# Patient Record
Sex: FEMALE | Race: WHITE | NOT HISPANIC OR LATINO
[De-identification: names, ages, dates, MRNs, and addresses within clinical notes are randomized per-mention and may not be internally consistent; named-entity substitution may affect disease eponyms.]

---

## 2020-01-03 ENCOUNTER — APPOINTMENT (OUTPATIENT)
Dept: OTOLARYNGOLOGY | Facility: CLINIC | Age: 65
End: 2020-01-03
Payer: COMMERCIAL

## 2020-01-03 VITALS
SYSTOLIC BLOOD PRESSURE: 119 MMHG | HEART RATE: 49 BPM | OXYGEN SATURATION: 97 % | BODY MASS INDEX: 22.76 KG/M2 | TEMPERATURE: 98.1 F | WEIGHT: 145 LBS | HEIGHT: 67 IN | RESPIRATION RATE: 15 BRPM | DIASTOLIC BLOOD PRESSURE: 77 MMHG

## 2020-01-03 DIAGNOSIS — H61.20 IMPACTED CERUMEN, UNSPECIFIED EAR: ICD-10-CM

## 2020-01-03 DIAGNOSIS — Z78.9 OTHER SPECIFIED HEALTH STATUS: ICD-10-CM

## 2020-01-03 DIAGNOSIS — Z87.891 PERSONAL HISTORY OF NICOTINE DEPENDENCE: ICD-10-CM

## 2020-01-03 DIAGNOSIS — Z82.2 FAMILY HISTORY OF DEAFNESS AND HEARING LOSS: ICD-10-CM

## 2020-01-03 DIAGNOSIS — Z72.89 OTHER PROBLEMS RELATED TO LIFESTYLE: ICD-10-CM

## 2020-01-03 DIAGNOSIS — Z82.0 FAMILY HISTORY OF EPILEPSY AND OTHER DISEASES OF THE NERVOUS SYSTEM: ICD-10-CM

## 2020-01-03 DIAGNOSIS — H61.21 IMPACTED CERUMEN, RIGHT EAR: ICD-10-CM

## 2020-01-03 PROBLEM — Z00.00 ENCOUNTER FOR PREVENTIVE HEALTH EXAMINATION: Status: ACTIVE | Noted: 2020-01-03

## 2020-01-03 PROCEDURE — 99242 OFF/OP CONSLTJ NEW/EST SF 20: CPT

## 2020-01-04 PROBLEM — H61.21 CERUMINOSIS, RIGHT: Status: ACTIVE | Noted: 2020-01-04

## 2020-01-04 NOTE — HISTORY OF PRESENT ILLNESS
[de-identified] : 63 yo woman with h/o recurrent cerumen impaction I had seen over 8 yrs ago at last visit - here with recurrent cerumen impaction in r ear, for removal. Referred by Dr Yolanda An. She reports that it recurs annually. Denies qtip use. denies pain or drainage but hearing is moderately decreased.

## 2023-01-24 ENCOUNTER — NON-APPOINTMENT (OUTPATIENT)
Age: 68
End: 2023-01-24

## 2023-03-06 ENCOUNTER — APPOINTMENT (OUTPATIENT)
Dept: OBGYN | Facility: CLINIC | Age: 68
End: 2023-03-06
Payer: MEDICARE

## 2023-03-06 VITALS
SYSTOLIC BLOOD PRESSURE: 125 MMHG | HEIGHT: 67 IN | HEART RATE: 75 BPM | DIASTOLIC BLOOD PRESSURE: 86 MMHG | WEIGHT: 148 LBS | BODY MASS INDEX: 23.23 KG/M2

## 2023-03-06 DIAGNOSIS — Z87.42 PERSONAL HISTORY OF OTHER DISEASES OF THE FEMALE GENITAL TRACT: ICD-10-CM

## 2023-03-06 DIAGNOSIS — Z01.419 ENCOUNTER FOR GYNECOLOGICAL EXAMINATION (GENERAL) (ROUTINE) W/OUT ABNORMAL FINDINGS: ICD-10-CM

## 2023-03-06 DIAGNOSIS — Z78.9 OTHER SPECIFIED HEALTH STATUS: ICD-10-CM

## 2023-03-06 DIAGNOSIS — Z82.69 FAMILY HISTORY OF OTHER DISEASES OF THE MUSCULOSKELETAL SYSTEM AND CONNECTIVE TISSUE: ICD-10-CM

## 2023-03-06 PROCEDURE — G0101: CPT

## 2023-03-06 NOTE — HISTORY OF PRESENT ILLNESS
[N] : Patient is not sexually active [Patient reported mammogram was normal] : Patient reported mammogram was normal [Hot Flashes] : hot flashes [Insomnia] : insomnia [Yes] : yes [Previously active] : previously active [Men] : men [FreeTextEntry1] : Patient present for initial office visit without complian [Patient reported bone density results were abnormal] : Patient reported bone density results were abnormal [Mammogramdate] : 1/2-23 [TextBox_37] : has known osteoporosis [TextBox_43] : has done 2 in past, will ask pmd [LMPDate] : 01/01/06 [TextBox_38] : 1 termination [TextBox_6] : mild clammy [TextBox_18] : stopped HT when she had breast bx (but it was benign)

## 2023-03-06 NOTE — REVIEW OF SYSTEMS
[Negative] : Heme/Lymph [FreeTextEntry8] : some dryness sense of needing to void [FreeTextEntry9] : some joint pain: trying to keep mobile [de-identified] : low bone mass

## 2023-03-06 NOTE — PHYSICAL EXAM
[Chaperone Present] : A chaperone was present in the examining room during all aspects of the physical examination [Appropriately responsive] : appropriately responsive [Alert] : alert [No Acute Distress] : no acute distress [No Lymphadenopathy] : no lymphadenopathy [Regular Rate Rhythm] : regular rate rhythm [Clear to Auscultation B/L] : clear to auscultation bilaterally [Soft] : soft [Oriented x3] : oriented x3 [Examination Of The Breasts] : a normal appearance [No Masses] : no breast masses were palpable [Labia Majora] : normal [Labia Minora] : normal [Atrophy] : atrophy [Normal] : normal [Normal Position] : in a normal position [Uterine Adnexae] : normal

## 2023-03-06 NOTE — PLAN
Patient walk-in to ED with c/o chest pain and pain radiating down Rt. arm that is non-reproducible. Pain started 1/2hour PTA to ED. [FreeTextEntry1] : annual: pap and hpv (had abnl 20 yrs ago?)\par \par hx of HT but came off after needing breast bx, but bx was benign\par doing yearly check at [facility] and just did mammo at [facility] 2 months ago, normal\par \par \par Bone density: now on alendronate\par may consider vagifem: r/b/a discussed\par to see PMD soon: will ask if due for colonoscopy\par not s/a now

## 2023-03-08 LAB — HPV HIGH+LOW RISK DNA PNL CVX: NOT DETECTED

## 2023-03-10 LAB — CYTOLOGY CVX/VAG DOC THIN PREP: ABNORMAL

## 2024-04-08 ENCOUNTER — RX RENEWAL (OUTPATIENT)
Age: 69
End: 2024-04-08

## 2024-04-08 RX ORDER — ESTRADIOL 10 UG/1
10 TABLET VAGINAL
Qty: 25 | Refills: 3 | Status: ACTIVE | COMMUNITY
Start: 2023-03-06 | End: 1900-01-01

## 2024-08-06 ENCOUNTER — TRANSCRIPTION ENCOUNTER (OUTPATIENT)
Age: 69
End: 2024-08-06

## 2024-08-06 ENCOUNTER — APPOINTMENT (OUTPATIENT)
Dept: OBGYN | Facility: CLINIC | Age: 69
End: 2024-08-06

## 2024-08-06 PROBLEM — N95.2 VAGINAL ATROPHY: Status: ACTIVE | Noted: 2024-08-06

## 2024-08-06 PROBLEM — Z71.89 COUNSELING FOR HORMONE REPLACEMENT THERAPY: Status: ACTIVE | Noted: 2024-08-06

## 2024-08-06 PROBLEM — Z87.42 HISTORY OF ABNORMAL CERVICAL PAP SMEAR: Status: RESOLVED | Noted: 2023-03-06 | Resolved: 2024-08-06

## 2024-08-06 PROBLEM — N89.8 VAGINAL DISCHARGE: Status: ACTIVE | Noted: 2024-08-06

## 2024-08-06 PROBLEM — K64.9 HEMORRHOID: Status: ACTIVE | Noted: 2024-08-06

## 2024-08-06 PROCEDURE — 99214 OFFICE O/P EST MOD 30 MIN: CPT

## 2024-08-06 PROCEDURE — 99459 PELVIC EXAMINATION: CPT

## 2024-08-06 NOTE — PHYSICAL EXAM
[Chaperone Present] : A chaperone was present in the examining room during all aspects of the physical examination [20235] : A chaperone was present during the pelvic exam. [Appropriately responsive] : appropriately responsive [Alert] : alert [No Acute Distress] : no acute distress [No Lymphadenopathy] : no lymphadenopathy [Regular Rate Rhythm] : regular rate rhythm [No Murmurs] : no murmurs [Clear to Auscultation B/L] : clear to auscultation bilaterally [Soft] : soft [Non-tender] : non-tender [Non-distended] : non-distended [No HSM] : No HSM [No Lesions] : no lesions [No Mass] : no mass [Oriented x3] : oriented x3 [Examination Of The Breasts] : a normal appearance [No Masses] : no breast masses were palpable [Labia Majora] : normal [Labia Minora] : normal [Normal] : normal [Uterine Adnexae] : normal [FreeTextEntry2] : nydia [Atrophy] : atrophy [No Bleeding] : There was no active vaginal bleeding [FreeTextEntry9] : s/p hemorrhoid repair

## 2024-08-06 NOTE — HISTORY OF PRESENT ILLNESS
[Patient reported mammogram was normal] : Patient reported mammogram was normal [Patient reported breast sonogram was normal] : Patient reported breast sonogram was normal [Patient reported PAP Smear was normal] : Patient reported PAP Smear was normal [Patient reported colonoscopy was normal] : Patient reported colonoscopy was normal [Yes] : yes [Options Discussed] : options discussed [Vaginal Lubrication] : vaginal lubrication [FreeTextEntry1] : Sharon presents in office today for gyn issues: 1. vaginal discharge; no blood, appears clear but somewhat increased 2. hemorrhoid was repaired surgically and is still having pain from the procedure 3. hx of abnormal pap in past  [Mammogramdate] : 02/24 [BreastSonogramDate] : 04/24 [PapSmeardate] : 2023 [TextBox_31] : but hx of abnormal [ColonoscopyDate] : past few yrs [TextBox_6] : *hx of HRT in past, now on vaginal atrophy

## 2024-08-06 NOTE — PLAN
[FreeTextEntry1] : Sharon presents in office today for gyn issues: 1. vaginal discharge; no blood, appears clear but somewhat increased 2. hemorrhoid was repaired surgically and is still having pain from the procedure 3. hx of abnormal pap in past 4. HRT discussion, did take topical estrogen for vagina doing well with it, refill r/b/a discussed 5. hx of dysmenorrhea in past 6. known osteoporosis in family, she is on alendronate and bone mass has stabilized  up to date breast imaging and colonoscopy  Over 50% of face to face time of visit counseling and coordination of care (total time 31 m)

## 2024-08-12 ENCOUNTER — TRANSCRIPTION ENCOUNTER (OUTPATIENT)
Age: 69
End: 2024-08-12

## 2024-08-13 ENCOUNTER — TRANSCRIPTION ENCOUNTER (OUTPATIENT)
Age: 69
End: 2024-08-13

## 2025-08-06 ENCOUNTER — APPOINTMENT (OUTPATIENT)
Dept: OBGYN | Facility: CLINIC | Age: 70
End: 2025-08-06
Payer: MEDICARE

## 2025-08-06 ENCOUNTER — NON-APPOINTMENT (OUTPATIENT)
Age: 70
End: 2025-08-06

## 2025-08-06 VITALS
SYSTOLIC BLOOD PRESSURE: 124 MMHG | HEIGHT: 67 IN | WEIGHT: 142 LBS | BODY MASS INDEX: 22.29 KG/M2 | OXYGEN SATURATION: 97 % | DIASTOLIC BLOOD PRESSURE: 80 MMHG | HEART RATE: 51 BPM

## 2025-08-06 DIAGNOSIS — Z82.62 FAMILY HISTORY OF OSTEOPOROSIS: ICD-10-CM

## 2025-08-06 DIAGNOSIS — Z78.9 OTHER SPECIFIED HEALTH STATUS: ICD-10-CM

## 2025-08-06 DIAGNOSIS — Z87.42 PERSONAL HISTORY OF OTHER DISEASES OF THE FEMALE GENITAL TRACT: ICD-10-CM

## 2025-08-06 DIAGNOSIS — N95.2 POSTMENOPAUSAL ATROPHIC VAGINITIS: ICD-10-CM

## 2025-08-06 DIAGNOSIS — Z01.419 ENCOUNTER FOR GYNECOLOGICAL EXAMINATION (GENERAL) (ROUTINE) W/OUT ABNORMAL FINDINGS: ICD-10-CM

## 2025-08-06 PROCEDURE — G0101: CPT

## 2025-08-06 RX ORDER — ALENDRONATE SODIUM 70 MG/1
TABLET ORAL
Refills: 0 | Status: ACTIVE | COMMUNITY

## 2025-08-08 LAB — HPV HIGH+LOW RISK DNA PNL CVX: NOT DETECTED

## 2025-08-12 ENCOUNTER — TRANSCRIPTION ENCOUNTER (OUTPATIENT)
Age: 70
End: 2025-08-12

## 2025-08-12 LAB — CYTOLOGY CVX/VAG DOC THIN PREP: NORMAL
